# Patient Record
Sex: FEMALE | Race: WHITE
[De-identification: names, ages, dates, MRNs, and addresses within clinical notes are randomized per-mention and may not be internally consistent; named-entity substitution may affect disease eponyms.]

---

## 2019-10-25 ENCOUNTER — HOSPITAL ENCOUNTER (EMERGENCY)
Dept: HOSPITAL 74 - JER | Age: 39
Discharge: HOME | End: 2019-10-25
Payer: COMMERCIAL

## 2019-10-25 VITALS — DIASTOLIC BLOOD PRESSURE: 83 MMHG | SYSTOLIC BLOOD PRESSURE: 118 MMHG | HEART RATE: 82 BPM | TEMPERATURE: 98.6 F

## 2019-10-25 VITALS — BODY MASS INDEX: 18.3 KG/M2

## 2019-10-25 DIAGNOSIS — J18.9: Primary | ICD-10-CM

## 2019-10-25 DIAGNOSIS — F17.210: ICD-10-CM

## 2019-10-25 LAB
ALBUMIN SERPL-MCNC: 3.8 G/DL (ref 3.4–5)
ALP SERPL-CCNC: 68 U/L (ref 45–117)
ALT SERPL-CCNC: 19 U/L (ref 13–61)
ANION GAP SERPL CALC-SCNC: 11 MMOL/L (ref 8–16)
APTT BLD: 29.8 SECONDS (ref 25.2–36.5)
AST SERPL-CCNC: 16 U/L (ref 15–37)
BASOPHILS # BLD: 0.9 % (ref 0–2)
BILIRUB SERPL-MCNC: 0.5 MG/DL (ref 0.2–1)
BUN SERPL-MCNC: 5 MG/DL (ref 7–18)
CALCIUM SERPL-MCNC: 8.9 MG/DL (ref 8.5–10.1)
CHLORIDE SERPL-SCNC: 103 MMOL/L (ref 98–107)
CO2 SERPL-SCNC: 23 MMOL/L (ref 21–32)
CREAT SERPL-MCNC: 0.7 MG/DL (ref 0.55–1.3)
DEPRECATED RDW RBC AUTO: 12.9 % (ref 11.6–15.6)
EOSINOPHIL # BLD: 0.3 % (ref 0–4.5)
GLUCOSE SERPL-MCNC: 87 MG/DL (ref 74–106)
HCT VFR BLD CALC: 41.9 % (ref 32.4–45.2)
HGB BLD-MCNC: 13.9 GM/DL (ref 10.7–15.3)
INR BLD: 1.28 (ref 0.83–1.09)
LYMPHOCYTES # BLD: 21 % (ref 8–40)
MCH RBC QN AUTO: 31.5 PG (ref 25.7–33.7)
MCHC RBC AUTO-ENTMCNC: 33.3 G/DL (ref 32–36)
MCV RBC: 94.8 FL (ref 80–96)
MONOCYTES # BLD AUTO: 7.9 % (ref 3.8–10.2)
NEUTROPHILS # BLD: 69.9 % (ref 42.8–82.8)
PLATELET # BLD AUTO: 294 K/MM3 (ref 134–434)
PMV BLD: 9.5 FL (ref 7.5–11.1)
POTASSIUM SERPLBLD-SCNC: 3.6 MMOL/L (ref 3.5–5.1)
PROT SERPL-MCNC: 7.3 G/DL (ref 6.4–8.2)
PT PNL PPP: 15.1 SEC (ref 9.7–13)
RBC # BLD AUTO: 4.42 M/MM3 (ref 3.6–5.2)
SODIUM SERPL-SCNC: 137 MMOL/L (ref 136–145)
WBC # BLD AUTO: 11.4 K/MM3 (ref 4–10)

## 2019-10-25 PROCEDURE — 3E033NZ INTRODUCTION OF ANALGESICS, HYPNOTICS, SEDATIVES INTO PERIPHERAL VEIN, PERCUTANEOUS APPROACH: ICD-10-PCS | Performed by: EMERGENCY MEDICINE

## 2019-10-25 PROCEDURE — 3E03329 INTRODUCTION OF OTHER ANTI-INFECTIVE INTO PERIPHERAL VEIN, PERCUTANEOUS APPROACH: ICD-10-PCS | Performed by: EMERGENCY MEDICINE

## 2019-10-25 PROCEDURE — 3E033GC INTRODUCTION OF OTHER THERAPEUTIC SUBSTANCE INTO PERIPHERAL VEIN, PERCUTANEOUS APPROACH: ICD-10-PCS | Performed by: EMERGENCY MEDICINE

## 2019-10-25 NOTE — PDOC
History of Present Illness





- General


Chief Complaint: Chest Pain


Stated Complaint: CHEST PAIN


Time Seen by Provider: 10/25/19 20:20





- History of Present Illness


Initial Comments: 





10/25/19 20:34


Ms. Vinson is a 38 yo female w/ no significant pmh who presents for 

evaluation of sudden onset left sided pleuritic chest pain that started 1 hour 

before presenting to ER. Patient reports that she had previously been in her 

normal state of health and was cooking dinner for a friend when it occurred. 

Denies any other symptoms, heavy lifting, or trauma. Denies any long flights or 

exodgenous hormone use.





The patient denies shortness of breath, headache and dizziness. Denies fever, 

chills, nausea, vomit, diarrhea and constipation. Denies dysuria, frequency, 

urgency and hematuria. 





Past History





- Past Medical History


Allergies/Adverse Reactions: 


 Allergies











Allergy/AdvReac Type Severity Reaction Status Date / Time


 


No Known Allergies Allergy   Verified 06/21/13 17:12











Home Medications: 


Ambulatory Orders





Azithromycin [Zithromax -] 250 mg PO DAILY #4 tablet 10/25/19 








Asthma: No


Cancer: No


Cardiac Disorders: No


COPD: No


Diabetes: No


HTN: No


Seizures: No


Thyroid Disease: No





- Psycho Social/Smoking Cessation Hx


Smoking History: Current every day smoker


Have you smoked in the past 12 months: Yes


Number of Cigarettes Smoked Daily: 20


Information on smoking cessation initiated: No


Hx Alcohol Use: No


Drug/Substance Use Hx: No


Hx Substance Use Treatment: No





**Review of Systems





- Review of Systems


Comments:: 





10/25/19 20:41


GENERAL/CONSTITUTIONAL: No fever or chills. No weakness.


HEAD, EYES, EARS, NOSE AND THROAT: No change in vision. No ear pain or 

discharge. No sore throat.


CARDIOVASCULAR: +Pleuritic chest pain as described. No shortness of breath


RESPIRATORY: No cough, wheezing, or hemoptysis.


GASTROINTESTINAL: No nausea, vomiting, diarrhea or constipation.


GENITOURINARY: No dysuria, frequency, or change in urination.


MUSCULOSKELETAL: No joint or muscle swelling or pain. No neck or back pain.


SKIN: No rash


NEUROLOGIC: No headache, vertigo, loss of consciousness, or change in strength/

sensation.


ENDOCRINE: No increased thirst. No abnormal weight change


HEMATOLOGIC/LYMPHATIC: No anemia, easy bleeding, or history of blood clots.


ALLERGIC/IMMUNOLOGIC: No hives or skin allergy.





*Physical Exam





- Vital Signs


 Last Vital Signs











Temp Pulse Resp BP Pulse Ox


 


 98.6 F   112 H  22 H  134/99   100 


 


 10/25/19 20:15  10/25/19 20:15  10/25/19 20:15  10/25/19 20:15  10/25/19 20:15














- Physical Exam


Comments: 





10/25/19 20:41


GENERAL: Awake, alert, and fully oriented, in no acute distress


HEAD: No signs of trauma, normocephalic, atraumatic 


EYES: PERRLA, EOMI, sclera anicteric, conjunctiva clear


ENT: Auricles normal inspection, hearing grossly normal, nares patent, 

oropharynx clear without


exudates. Moist mucosa


NECK: Normal ROM, supple, no lymphadenopathy, JVD, or masses


LUNGS: No distress, speaks full sentences, clear to auscultation bilaterally 


HEART: +Tachycardic rate. Regular rhythm, normal S1 and S2, no murmurs, rubs or 

gallops, peripheral pulses normal and equal bilaterally. 


ABDOMEN: Soft, nontender, normoactive bowel sounds. No guarding, no rebound. No 

masses


EXTREMITIES: Normal inspection, Normal range of motion, no edema. No clubbing 

or cyanosis. 


NEUROLOGICAL: Cranial nerves II through XII grossly intact. Normal speech, 

normal gait, no focal sensorimotor deficits 


SKIN: Warm, Dry, normal turgor, no rashes or lesions noted. 





ED Treatment Course





- LABORATORY


CBC & Chemistry Diagram: 


 10/25/19 20:30





 10/25/19 20:30





Medical Decision Making





- Medical Decision Making





10/25/19 20:38


Ms. Vinson is a 38 yo female w/ no significant pmh who presents for 

evaluation of symptoms concerning for ACS vs. PE vs. other pleural process. 

Given concerning history for PE, patient will be evaluated with d-dimer, 

cardiac labs, and EKG. Patient given NS, tylenol, and ASA for symptomatic 

relief. Further workup pending.





10/25/19 22:16


Patient d-dimer/other labs grossly wnl. Patient reporting relief from symptoms 

and XR negative. EKG normal sinus. No concern for acute process at this time. 

Discharging to home for further outpatient follow-up.





 Laboratory Results - last 24 hr











  10/25/19 10/25/19 10/25/19





  20:30 20:30 20:30


 


WBC  11.4 H  


 


RBC  4.42  


 


Hgb  13.9  


 


Hct  41.9  D  


 


MCV  94.8  


 


MCH  31.5  


 


MCHC  33.3  


 


RDW  12.9  


 


Plt Count  294  D  


 


MPV  9.5  D  


 


Absolute Neuts (auto)  8.0  


 


Neutrophils %  69.9  


 


Lymphocytes %  21.0  


 


Monocytes %  7.9  


 


Eosinophils %  0.3  


 


Basophils %  0.9  


 


Nucleated RBC %  0  


 


PT with INR   15.10 H 


 


INR   1.28 H 


 


PTT (Actin FS)   29.8 


 


D-Dimer   


 


Sodium    137


 


Potassium    3.6


 


Chloride    103


 


Carbon Dioxide    23


 


Anion Gap    11


 


BUN    5.0 L


 


Creatinine    0.7


 


Est GFR (CKD-EPI)AfAm    126.49


 


Est GFR (CKD-EPI)NonAf    109.14


 


Random Glucose    87


 


Calcium    8.9


 


Total Bilirubin    0.5


 


AST    16


 


ALT    19


 


Alkaline Phosphatase    68


 


Creatine Kinase    191


 


Creatine Kinase Index    No Result Required.


 


CK-MB (CK-2)    < 1.0


 


Troponin I    < 0.02


 


Total Protein    7.3


 


Albumin    3.8


 


Serum Pregnancy, Qual   














  10/25/19 10/25/19





  20:30 20:30


 


WBC  


 


RBC  


 


Hgb  


 


Hct  


 


MCV  


 


MCH  


 


MCHC  


 


RDW  


 


Plt Count  


 


MPV  


 


Absolute Neuts (auto)  


 


Neutrophils %  


 


Lymphocytes %  


 


Monocytes %  


 


Eosinophils %  


 


Basophils %  


 


Nucleated RBC %  


 


PT with INR  


 


INR  


 


PTT (Actin FS)  


 


D-Dimer   238


 


Sodium  


 


Potassium  


 


Chloride  


 


Carbon Dioxide  


 


Anion Gap  


 


BUN  


 


Creatinine  


 


Est GFR (CKD-EPI)AfAm  


 


Est GFR (CKD-EPI)NonAf  


 


Random Glucose  


 


Calcium  


 


Total Bilirubin  


 


AST  


 


ALT  


 


Alkaline Phosphatase  


 


Creatine Kinase  


 


Creatine Kinase Index  


 


CK-MB (CK-2)  


 


Troponin I  


 


Total Protein  


 


Albumin  


 


Serum Pregnancy, Qual  Negative 














Discharge





- Discharge Information


Problems reviewed: Yes


Clinical Impression/Diagnosis: 


 Pain





Disposition: HOME





- Additional Discharge Information


Prescriptions: 


Azithromycin [Zithromax -] 250 mg PO DAILY #4 tablet





- Follow up/Referral





- Patient Discharge Instructions


Patient Printed Discharge Instructions:  DI for Atypical Chest Pain


Additional Instructions: 


You were evaluated today in the ER for your pain. We performed laboratory 

testing which were all negative and your symptoms improved with medication. We 

do not believe anything emergent is occurring at this time. We also sent a 

prescription to your pharmacy. Take all medications as proscribed. Please follow

-up with primary care provider for further evaluation. Return to ER if any fever

, chills, return of pain, or other concerning symptoms.





- Post Discharge Activity

## 2019-10-25 NOTE — PDOC
Attending Attestation





- Resident


Resident Name: Michele Ruiz





- ED Attending Attestation


I have performed the following: I have examined & evaluated the patient, The 

case was reviewed & discussed with the resident, I agree w/resident's findings 

& plan





- HPI


HPI: 





10/25/19 20:32


Pt comes with pleuritic chest pain. She is thin, and a smoker.  She had her 

tubes toed after her 3 NSVDs and she takes no OCPs.


She uses no drugs and only occ alcohol. She takes no meds and she has no docs. 

She is healthy; she doesn't work.


She feels febrile here.


No cough however.





- Physicial Exam


PE: 





10/25/19 22:17


Agree with resident exam








- Medical Decision Making





10/25/19 22:17


Pt has normal labs; she has a clear CXR; however she is a smoker and she 

describes a green cough; she will be treated for an atypical pneumonia.


10/25/19 22:18


Pt is feeling better after hydration and meds in the ER.

## 2019-10-26 NOTE — EKG
Test Reason : 

Blood Pressure : ***/*** mmHG

Vent. Rate : 103 BPM     Atrial Rate : 103 BPM

   P-R Int : 128 ms          QRS Dur : 074 ms

    QT Int : 328 ms       P-R-T Axes : 059 061 085 degrees

   QTc Int : 429 ms

 

*** POOR DATA QUALITY, INTERPRETATION MAY BE ADVERSELY AFFECTED

SINUS TACHYCARDIA

NONSPECIFIC T WAVE ABNORMALITY

ABNORMAL ECG

NO PREVIOUS ECGS AVAILABLE

Confirmed by MARTA MULLER, KATHARINE ([id number]) on 10/26/2019 2:55:17 PM

 

Referred By:             Confirmed By:KATHARINE LOZANO MD